# Patient Record
Sex: FEMALE | Race: WHITE | Employment: FULL TIME | URBAN - METROPOLITAN AREA
[De-identification: names, ages, dates, MRNs, and addresses within clinical notes are randomized per-mention and may not be internally consistent; named-entity substitution may affect disease eponyms.]

---

## 2023-11-04 NOTE — PROGRESS NOTES
Date of Service 11/4/2023    [facility]  Date of Birth 9/3/1970    Patient Age: 48year old    PCP: Sulma Chandler  [address]  [address]  [facility] [address]    Heart Scan Consult  Preliminary Heart Scan Score: 5.72    Previous Screening  Heart Scan Completed Previously: No        Peripheral Vascular Scan Completed Previously: No          Risk Factors  Personal Risk Factors  Non-alterable Risk Factors: Age;Gender;Family History (Reports Father had a Heart Attack in his 46s)  Alterable Risk Factors: Lack of exercise; Abnormal Cholesterol;Stress      Body Mass Index  There is no height or weight on file to calculate BMI. Blood Pressure     /80 (BP Location: Left arm)     (Normal =< 120/80,  Elevated = 120-129/ >80,  High Stage1 130-139/80-89 , Stage2 >140/>90)    Lipid Profile  3/28/2023  Total Cholesterol - 212  HDL - 66  LDL - 129  Triglycerides - 95    Cholesterol Goals  Value   Total  =< 200   HDL  = > 45 Men = > 55 Women   LDL   =< 100   Triglycerides  =< 150       Glucose and Hemoglobin A1C  3/28/2023  Glucose - 90    (Normal Fasting Glucose < 100mg/dl )    Nurse Review  Risk factor information and results reviewed with Nurse: Yes    Recommended Follow Up:  Consult your physician regarding[de-identified] Final Heart Scan Report; Discuss potential for Incidental Finding      Recommendations for Change:  Nutrition Changes: Low Saturated Fat;Low Fat Dairy; Low Salt Eating; Increase Fiber    Cholesterol Modification (goal of therapy depends upon your risk): Decrease LDL (Lousy/Bad) Ideal <100;Decrease Total Normal <200    Exercise: Initiate Program              Stress Management: Adopt Stress Management Techniques    Repeat Heart Scan: 3 Years if Calcium Score is > 0. 0; Discuss with your Physician              [place] Recommended Resources:  Recommended Resources: Upcoming Classes, Medical Services and Health Library [email or web address]. Brenton Chow RN        Please Contact the Nurse Heart Line with any Questions or Concerns [phone].

## 2024-01-01 ENCOUNTER — APPOINTMENT (OUTPATIENT)
Dept: GENERAL RADIOLOGY | Facility: HOSPITAL | Age: 54
End: 2024-01-01
Attending: EMERGENCY MEDICINE

## 2024-01-01 ENCOUNTER — HOSPITAL ENCOUNTER (EMERGENCY)
Facility: HOSPITAL | Age: 54
Discharge: LEFT BEFORE DISCHARGE | End: 2024-01-01

## 2024-01-01 VITALS
TEMPERATURE: 100 F | OXYGEN SATURATION: 96 % | WEIGHT: 140 LBS | SYSTOLIC BLOOD PRESSURE: 126 MMHG | RESPIRATION RATE: 16 BRPM | DIASTOLIC BLOOD PRESSURE: 82 MMHG | HEART RATE: 109 BPM

## 2024-01-01 NOTE — ED INITIAL ASSESSMENT (HPI)
Pt arrives to ed w c/o bilateral ear pain and sore throat. Pt reports \"brown discharge\" when she coughs. Pt also reports headache. Pt was just tested for covid/rsv/flu and strep yesterday which were all negative, pt was seen at an .